# Patient Record
Sex: FEMALE | Race: BLACK OR AFRICAN AMERICAN | NOT HISPANIC OR LATINO | Employment: FULL TIME | URBAN - METROPOLITAN AREA
[De-identification: names, ages, dates, MRNs, and addresses within clinical notes are randomized per-mention and may not be internally consistent; named-entity substitution may affect disease eponyms.]

---

## 2020-04-14 PROBLEM — I10 ESSENTIAL HYPERTENSION: Status: ACTIVE | Noted: 2017-12-27

## 2020-04-14 PROBLEM — J31.0 CHRONIC RHINITIS: Status: ACTIVE | Noted: 2018-02-23

## 2021-10-26 PROBLEM — I10 ESSENTIAL HYPERTENSION: Chronic | Status: ACTIVE | Noted: 2017-12-27

## 2021-10-26 PROBLEM — L29.9 PRURITUS: Status: ACTIVE | Noted: 2021-10-26

## 2021-10-26 PROBLEM — E87.6 HYPOKALEMIA: Status: ACTIVE | Noted: 2021-10-26

## 2021-10-26 PROBLEM — E66.9 OBESITY: Status: ACTIVE | Noted: 2021-10-26

## 2021-10-26 PROBLEM — E66.9 OBESITY: Chronic | Status: ACTIVE | Noted: 2021-10-26

## 2021-10-26 PROBLEM — B37.31 VAGINAL YEAST INFECTION: Status: ACTIVE | Noted: 2021-10-26

## 2021-10-26 PROBLEM — M54.30 SCIATICA: Chronic | Status: ACTIVE | Noted: 2021-10-26

## 2021-10-26 PROBLEM — E11.65 UNCONTROLLED TYPE 2 DIABETES MELLITUS WITH HYPERGLYCEMIA: Status: ACTIVE | Noted: 2021-10-26

## 2021-10-26 PROBLEM — M54.30 SCIATICA: Status: ACTIVE | Noted: 2021-10-26

## 2021-10-26 PROBLEM — E87.6 HYPOKALEMIA: Chronic | Status: ACTIVE | Noted: 2021-10-26

## 2021-10-26 PROBLEM — B37.31 VAGINAL YEAST INFECTION: Chronic | Status: ACTIVE | Noted: 2021-10-26

## 2021-10-26 PROBLEM — Z86.19 HISTORY OF HELICOBACTER PYLORI INFECTION: Status: ACTIVE | Noted: 2021-10-26

## 2021-10-26 PROBLEM — E11.65 UNCONTROLLED TYPE 2 DIABETES MELLITUS WITH HYPERGLYCEMIA: Chronic | Status: ACTIVE | Noted: 2021-10-26

## 2021-10-26 PROBLEM — Z86.19 HISTORY OF HELICOBACTER PYLORI INFECTION: Chronic | Status: ACTIVE | Noted: 2021-10-26

## 2022-02-15 PROBLEM — E11.9 TYPE 2 DIABETES MELLITUS WITHOUT COMPLICATION, WITHOUT LONG-TERM CURRENT USE OF INSULIN: Status: ACTIVE | Noted: 2022-02-15

## 2022-02-15 PROBLEM — E83.42 HYPOMAGNESEMIA: Status: ACTIVE | Noted: 2022-02-15

## 2022-08-10 PROBLEM — F41.3 OTHER MIXED ANXIETY DISORDERS: Status: ACTIVE | Noted: 2022-08-10

## 2022-08-10 PROBLEM — J30.1 ALLERGIC RHINITIS DUE TO POLLEN: Chronic | Status: ACTIVE | Noted: 2022-08-10

## 2022-08-10 PROBLEM — M54.9 DORSALGIA, UNSPECIFIED: Chronic | Status: ACTIVE | Noted: 2022-08-10

## 2022-08-10 PROBLEM — Z00.00 PREVENTATIVE HEALTH CARE: Status: ACTIVE | Noted: 2022-08-10

## 2022-09-29 LAB
LEFT EYE DM RETINOPATHY: NEGATIVE
RIGHT EYE DM RETINOPATHY: NEGATIVE

## 2022-10-21 PROBLEM — E11.65 TYPE 2 DIABETES MELLITUS WITH HYPERGLYCEMIA, WITHOUT LONG-TERM CURRENT USE OF INSULIN: Status: ACTIVE | Noted: 2022-02-15

## 2022-11-14 PROBLEM — Z00.00 PREVENTATIVE HEALTH CARE: Status: RESOLVED | Noted: 2022-08-10 | Resolved: 2022-11-14

## 2022-11-28 ENCOUNTER — PATIENT OUTREACH (OUTPATIENT)
Dept: ADMINISTRATIVE | Facility: HOSPITAL | Age: 52
End: 2022-11-28
Payer: MEDICAID

## 2022-11-28 ENCOUNTER — PATIENT MESSAGE (OUTPATIENT)
Dept: ADMINISTRATIVE | Facility: HOSPITAL | Age: 52
End: 2022-11-28
Payer: MEDICAID

## 2022-12-12 ENCOUNTER — PATIENT OUTREACH (OUTPATIENT)
Dept: ADMINISTRATIVE | Facility: HOSPITAL | Age: 52
End: 2022-12-12
Payer: MEDICAID

## 2022-12-12 PROBLEM — Z79.899 ENCOUNTER FOR LONG-TERM CURRENT USE OF MEDICATION: Status: ACTIVE | Noted: 2022-08-10

## 2022-12-19 ENCOUNTER — PATIENT OUTREACH (OUTPATIENT)
Dept: ADMINISTRATIVE | Facility: HOSPITAL | Age: 52
End: 2022-12-19
Payer: MEDICAID

## 2023-01-06 ENCOUNTER — PATIENT OUTREACH (OUTPATIENT)
Dept: ADMINISTRATIVE | Facility: HOSPITAL | Age: 53
End: 2023-01-06
Payer: MEDICAID

## 2023-02-20 ENCOUNTER — DOCUMENTATION ONLY (OUTPATIENT)
Dept: RESEARCH | Facility: CLINIC | Age: 53
End: 2023-02-20
Payer: MEDICAID

## 2023-02-20 DIAGNOSIS — Z00.6 RESEARCH SUBJECT: Primary | ICD-10-CM

## 2023-02-20 NOTE — PROGRESS NOTES
PROPEL IT CONSENT DOCUMENTATION  Oncore Protocol 2022013: PROPEL IT  PROmoting Successful Weight Loss in Primary CarE in [place] (PROPEL) using Information Technology  : Varun Moore, PhD.  IRB of Record: Formerly [facility] ([facility]) ID# [facility] [id number]  [facility] Investigator: Mary Cochran MD      Informed Consent Process   Name of Study Team member Present for discussion: N/A   Was the consent done electronically: yes  Is the potential subject currently enrolled in any other research trials (per Epic)? no  Prior to the Informed Consent being signed or any protocol required testing, procedure or intervention being performed, the following was completed or discussed:   Purpose of the study, qualifications to participate:  Study design, schedule and procedure:  Risks, benefits, alternative treatments, compensation and costs:  Confidentiality and HIPAA authorization for Release of Medical Records for the research trial/subjects right/study related injury:  Study related contact information:  Voluntary participation and withdrawal from the research trial at any time:  Patient has been offered the opportunity to ask questions regarding the study    and PI contact information given to subject:  Signed copy of consent form was provided to the subject via eMail:  Original consent or copy of electronic consent in subject's chart and uploaded in EPIC:        Bibiana JENNIFER Abdalla electronically signed the informed consent form.   The consent form as reviewed by Bharati Marks  Name: ([facility] personnel reviewing consent form):  Loretta Gabriel, Associate Clinical Research Coordinator   Comments: See  for Consent Forms        I acknowledge that I have reviewed the informed consent form and viewed the volunteer's electronically signed and dated the signature section of the consent forms.yes    Participant ID ([employer] ID) added to Research Study  yes]

## 2023-02-20 NOTE — PROGRESS NOTES
"PROPEL IT Weight Loss: Eligibility Confirmation  Remember to add Participant ID in Research Study  Age as of Today: 52 y.o.    Is patient age 40 to 70 years yes    Is patient race Black/: Yes Epic: Black or      Primary care provider at [facility]: Silva Amaya NP     Does the patient have an active [facility] portal account?  yes    Does the patient have prediabetes or Type 2 diabetes? yes  If yes, Based on: Type II diabetes ICD10 code    LAST HBA1C   Lab Results   Component Value Date    HGBA1C 11.8 (H) 11/17/2022    HGBA1C 10.4 (H) 08/08/2022    HGBA1C 7.2 (H) 02/11/2022          LAST BMI:   BMI Readings from Last 1 Encounters:   11/17/22 37.70 kg/m²      Was the patient's most recent BMI (within the past 12 months) between 30 and 50  yes    PCP REFERRAL  Did provider acknowledge or deny patient's participation? Need to Pend Order to PCP (after Consent)    LAST WEIGHT  (verify if this was an outpatient):   Wt Readings from Last 4 Encounters:   11/17/22 106 kg (233 lb 9.6 oz)   08/08/22 104.6 kg (230 lb 9.6 oz)   03/04/22 101.4 kg (223 lb 9.6 oz)   02/22/22 101.2 kg (223 lb)          PROPEL-IT Screening Date (enter from [email or web address]): 2/17/2023  Does the patient have a baseline clinic weight collected within 4 weeks (34 days) of Screening Date?  No, and within 4 week window     *2. If the "Last Weight at Encounter" is not from outpatient visit or not within 34 days from today enter the next  eligible baseline clinic weight when it occurs.  Weight (kg)                        Date:                     *3. If the last weight is outside of the Screening date window (4 weeks /34 days)  when is the patient's next scheduled clinic appointment?  3/17/2023    Status Updated: 02/20/2023      "

## 2023-03-01 DIAGNOSIS — E11.9 TYPE 2 DIABETES MELLITUS WITHOUT COMPLICATION: ICD-10-CM

## 2023-03-08 ENCOUNTER — PATIENT MESSAGE (OUTPATIENT)
Dept: ADMINISTRATIVE | Facility: HOSPITAL | Age: 53
End: 2023-03-08
Payer: MEDICAID

## 2023-03-09 ENCOUNTER — PATIENT OUTREACH (OUTPATIENT)
Dept: ADMINISTRATIVE | Facility: HOSPITAL | Age: 53
End: 2023-03-09
Payer: MEDICAID

## 2023-03-13 PROBLEM — Z00.00 PREVENTATIVE HEALTH CARE: Status: RESOLVED | Noted: 2022-08-10 | Resolved: 2023-03-13

## 2023-03-16 ENCOUNTER — PATIENT OUTREACH (OUTPATIENT)
Dept: ADMINISTRATIVE | Facility: HOSPITAL | Age: 53
End: 2023-03-16
Payer: MEDICAID

## 2023-03-17 PROBLEM — R29.6 FALLS FREQUENTLY: Status: ACTIVE | Noted: 2023-03-17
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
## 2023-03-17 PROBLEM — M54.32 SCIATICA OF LEFT SIDE: Status: ACTIVE | Noted: 2021-10-26

## 2023-03-17 PROBLEM — R26.2 TROUBLE WALKING: Status: ACTIVE | Noted: 2023-03-17

## 2023-03-17 PROBLEM — M25.531 RIGHT WRIST PAIN: Status: ACTIVE | Noted: 2023-03-17

## 2023-03-17 PROBLEM — G89.29 CHRONIC PAIN OF BOTH SHOULDERS: Status: ACTIVE | Noted: 2023-03-17

## 2023-03-17 PROBLEM — T78.40XA ALLERGIC REACTION: Status: ACTIVE | Noted: 2023-03-17

## 2023-03-17 PROBLEM — K21.9 GASTROESOPHAGEAL REFLUX DISEASE WITHOUT ESOPHAGITIS: Status: ACTIVE | Noted: 2023-03-17

## 2023-03-17 PROBLEM — M25.511 CHRONIC PAIN OF BOTH SHOULDERS: Status: ACTIVE | Noted: 2023-03-17

## 2023-03-17 PROBLEM — M25.512 CHRONIC PAIN OF BOTH SHOULDERS: Status: ACTIVE | Noted: 2023-03-17

## 2023-03-20 ENCOUNTER — RESEARCH ENCOUNTER (OUTPATIENT)
Dept: RESEARCH | Facility: CLINIC | Age: 53
End: 2023-03-20
Payer: MEDICAID

## 2023-03-20 DIAGNOSIS — Z00.6 RESEARCH SUBJECT: ICD-10-CM

## 2023-03-23 ENCOUNTER — TELEPHONE (OUTPATIENT)
Dept: RESEARCH | Facility: CLINIC | Age: 53
End: 2023-03-23
Payer: MEDICAID

## 2023-03-27 ENCOUNTER — TELEPHONE (OUTPATIENT)
Dept: RESEARCH | Facility: CLINIC | Age: 53
End: 2023-03-27
Payer: MEDICAID

## 2023-03-27 NOTE — TELEPHONE ENCOUNTER
CRC spoke with Ms. Abdalla to do the Onboarding call. Pt was not home and requested to be called back. CRC called back, no answer, LVM requesting a call back.

## 2023-04-20 ENCOUNTER — PATIENT MESSAGE (OUTPATIENT)
Dept: RESEARCH | Facility: CLINIC | Age: 53
End: 2023-04-20
Payer: MEDICAID

## 2023-04-20 ENCOUNTER — TELEPHONE (OUTPATIENT)
Dept: RESEARCH | Facility: CLINIC | Age: 53
End: 2023-04-20
Payer: MEDICAID

## 2023-04-20 NOTE — TELEPHONE ENCOUNTER
CRC spoke with Ms. Abdalla to do the Onboarding call. Pt stated she needs to delay intervention for one month. Pt reports she is facing many family issues at this time.CRC will follow up the week of May 22-26, 2023.

## 2023-05-24 ENCOUNTER — TELEPHONE (OUTPATIENT)
Dept: RESEARCH | Facility: CLINIC | Age: 53
End: 2023-05-24
Payer: MEDICAID

## 2023-05-24 NOTE — TELEPHONE ENCOUNTER
CRC contacted Ms. Abdalla to do the Onboarding call as requested by Pt. Ms. Abdalla still requested to delay intervention. Pt stated she is not ready to start and would like to be contacted in the middle of June. CRC will follow up.

## 2023-06-19 PROBLEM — Z00.00 PREVENTATIVE HEALTH CARE: Status: RESOLVED | Noted: 2022-08-10 | Resolved: 2023-06-19

## 2023-06-22 ENCOUNTER — TELEPHONE (OUTPATIENT)
Dept: RESEARCH | Facility: CLINIC | Age: 53
End: 2023-06-22
Payer: MEDICAID

## 2023-06-22 NOTE — TELEPHONE ENCOUNTER
CRC contacted Ms. Abdalla as she requested. Pt stated she no longer wants to participate in the study. CRC was unable to ask any questions as the call was disconnected. [facility] will be informed of Pt's decision.

## 2023-07-21 PROBLEM — R11.0 NAUSEA: Status: ACTIVE | Noted: 2023-07-21

## 2023-07-21 PROBLEM — M62.838 MUSCLE SPASM: Status: ACTIVE | Noted: 2023-07-21

## 2023-08-03 PROBLEM — R35.0 URINARY FREQUENCY: Status: ACTIVE | Noted: 2023-08-03

## 2023-08-03 PROBLEM — Z12.31 ENCOUNTER FOR SCREENING MAMMOGRAM FOR BREAST CANCER: Status: ACTIVE | Noted: 2022-08-10

## 2023-08-03 PROBLEM — R30.0 DYSURIA: Status: ACTIVE | Noted: 2023-08-03

## 2023-10-20 ENCOUNTER — PATIENT OUTREACH (OUTPATIENT)
Dept: ADMINISTRATIVE | Facility: HOSPITAL | Age: 53
End: 2023-10-20
Payer: MEDICAID

## 2023-10-20 DIAGNOSIS — Z79.4 TYPE 2 DIABETES MELLITUS WITHOUT COMPLICATION, WITH LONG-TERM CURRENT USE OF INSULIN: Primary | ICD-10-CM

## 2023-10-20 DIAGNOSIS — E11.9 TYPE 2 DIABETES MELLITUS WITHOUT COMPLICATION, WITH LONG-TERM CURRENT USE OF INSULIN: Primary | ICD-10-CM

## 2023-11-13 ENCOUNTER — PATIENT MESSAGE (OUTPATIENT)
Dept: ADMINISTRATIVE | Facility: HOSPITAL | Age: 53
End: 2023-11-13
Payer: MEDICAID

## 2023-12-26 ENCOUNTER — PATIENT OUTREACH (OUTPATIENT)
Dept: ADMINISTRATIVE | Facility: HOSPITAL | Age: 53
End: 2023-12-26
Payer: MEDICAID

## 2024-02-20 ENCOUNTER — PATIENT MESSAGE (OUTPATIENT)
Dept: ADMINISTRATIVE | Facility: HOSPITAL | Age: 54
End: 2024-02-20
Payer: MEDICAID

## 2024-03-01 ENCOUNTER — PATIENT OUTREACH (OUTPATIENT)
Dept: ADMINISTRATIVE | Facility: HOSPITAL | Age: 54
End: 2024-03-01
Payer: MEDICAID

## 2024-03-01 ENCOUNTER — PATIENT MESSAGE (OUTPATIENT)
Dept: ADMINISTRATIVE | Facility: HOSPITAL | Age: 54
End: 2024-03-01
Payer: MEDICAID

## 2024-03-01 DIAGNOSIS — Z12.31 BREAST CANCER SCREENING BY MAMMOGRAM: ICD-10-CM

## 2024-03-01 DIAGNOSIS — Z79.4 TYPE 2 DIABETES MELLITUS WITHOUT COMPLICATION, WITH LONG-TERM CURRENT USE OF INSULIN: Primary | ICD-10-CM

## 2024-03-01 DIAGNOSIS — E11.9 TYPE 2 DIABETES MELLITUS WITHOUT COMPLICATION, WITH LONG-TERM CURRENT USE OF INSULIN: Primary | ICD-10-CM

## 2024-04-23 ENCOUNTER — PATIENT OUTREACH (OUTPATIENT)
Dept: ADMINISTRATIVE | Facility: HOSPITAL | Age: 54
End: 2024-04-23
Payer: MEDICAID

## 2024-04-23 DIAGNOSIS — Z12.12 SCREENING FOR COLORECTAL CANCER: Primary | ICD-10-CM

## 2024-04-23 DIAGNOSIS — Z12.11 SCREENING FOR COLORECTAL CANCER: Primary | ICD-10-CM

## 2024-04-23 PROBLEM — R30.0 DYSURIA: Status: RESOLVED | Noted: 2023-08-03 | Resolved: 2024-04-23

## 2024-04-23 PROBLEM — R26.2 TROUBLE WALKING: Status: RESOLVED | Noted: 2023-03-17 | Resolved: 2024-04-23

## 2024-04-23 PROBLEM — T78.40XA ALLERGIC REACTION: Status: RESOLVED | Noted: 2023-03-17 | Resolved: 2024-04-23

## 2024-04-23 PROBLEM — R35.0 URINARY FREQUENCY: Status: RESOLVED | Noted: 2023-08-03 | Resolved: 2024-04-23

## 2024-04-23 PROBLEM — R29.6 FALLS FREQUENTLY: Status: RESOLVED | Noted: 2023-03-17 | Resolved: 2024-04-23

## 2024-04-23 PROBLEM — E83.42 HYPOMAGNESEMIA: Status: RESOLVED | Noted: 2022-02-15 | Resolved: 2024-04-23
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
## 2024-04-23 PROBLEM — Z00.6 RESEARCH SUBJECT: Status: RESOLVED | Noted: 2023-03-20 | Resolved: 2024-04-23

## 2024-04-23 PROBLEM — M25.512 CHRONIC PAIN OF BOTH SHOULDERS: Status: RESOLVED | Noted: 2023-03-17 | Resolved: 2024-04-23

## 2024-04-23 PROBLEM — M62.838 MUSCLE SPASM: Chronic | Status: ACTIVE | Noted: 2023-07-21

## 2024-04-23 PROBLEM — L29.9 PRURITUS: Status: RESOLVED | Noted: 2021-10-26 | Resolved: 2024-04-23

## 2024-04-23 PROBLEM — F41.3 OTHER MIXED ANXIETY DISORDERS: Status: RESOLVED | Noted: 2022-08-10 | Resolved: 2024-04-23

## 2024-04-23 PROBLEM — G89.29 CHRONIC PAIN OF BOTH SHOULDERS: Status: RESOLVED | Noted: 2023-03-17 | Resolved: 2024-04-23

## 2024-04-23 PROBLEM — M25.511 CHRONIC PAIN OF BOTH SHOULDERS: Status: RESOLVED | Noted: 2023-03-17 | Resolved: 2024-04-23

## 2024-04-23 PROBLEM — K21.9 GASTROESOPHAGEAL REFLUX DISEASE WITHOUT ESOPHAGITIS: Chronic | Status: ACTIVE | Noted: 2023-03-17

## 2024-04-23 PROBLEM — M25.531 RIGHT WRIST PAIN: Status: RESOLVED | Noted: 2023-03-17 | Resolved: 2024-04-23

## 2024-04-23 PROBLEM — Z79.899 ENCOUNTER FOR LONG-TERM CURRENT USE OF MEDICATION: Status: RESOLVED | Noted: 2022-08-10 | Resolved: 2024-04-23

## 2024-04-23 PROBLEM — Z86.19 HISTORY OF HELICOBACTER PYLORI INFECTION: Chronic | Status: RESOLVED | Noted: 2021-10-26 | Resolved: 2024-04-23

## 2024-04-23 PROBLEM — J31.0 CHRONIC RHINITIS: Status: RESOLVED | Noted: 2018-02-23 | Resolved: 2024-04-23

## 2024-07-23 PROBLEM — Z86.0100 HISTORY OF COLONIC POLYPS: Status: ACTIVE | Noted: 2024-07-23

## 2024-08-15 PROBLEM — K52.9 COLITIS: Status: ACTIVE | Noted: 2024-08-15

## 2024-10-25 PROBLEM — R09.89 CHEST CONGESTION: Status: ACTIVE | Noted: 2024-10-25

## 2024-10-25 PROBLEM — R05.1 ACUTE COUGH: Status: ACTIVE | Noted: 2024-10-25

## 2024-10-25 PROBLEM — R06.02 SOB (SHORTNESS OF BREATH): Status: ACTIVE | Noted: 2024-10-25

## 2024-10-25 PROBLEM — J22 LOWER RESPIRATORY INFECTION: Status: ACTIVE | Noted: 2024-10-25

## 2024-10-25 PROBLEM — R06.2 WHEEZE: Status: ACTIVE | Noted: 2024-10-25

## 2024-11-07 PROBLEM — L29.9 PRURITUS: Status: RESOLVED | Noted: 2021-10-26 | Resolved: 2024-11-07

## 2024-11-07 PROBLEM — Z86.0100 HISTORY OF COLONIC POLYPS: Status: RESOLVED | Noted: 2024-07-23 | Resolved: 2024-11-07

## 2024-11-07 PROBLEM — R06.02 SOB (SHORTNESS OF BREATH): Status: RESOLVED | Noted: 2024-10-25 | Resolved: 2024-11-07

## 2024-11-07 PROBLEM — M54.32 SCIATICA OF LEFT SIDE: Chronic | Status: ACTIVE | Noted: 2021-10-26

## 2024-11-07 PROBLEM — K52.9 COLITIS: Status: RESOLVED | Noted: 2024-08-15 | Resolved: 2024-11-07

## 2025-02-04 PROBLEM — M62.838 MUSCLE SPASM: Chronic | Status: RESOLVED | Noted: 2023-07-21 | Resolved: 2025-02-04

## 2025-02-04 PROBLEM — K51.90 ULCERATIVE COLITIS: Status: ACTIVE | Noted: 2024-08-15

## 2025-02-04 PROBLEM — J22 LOWER RESPIRATORY INFECTION: Status: RESOLVED | Noted: 2024-10-25 | Resolved: 2025-02-04

## 2025-02-04 PROBLEM — M54.9 DORSALGIA, UNSPECIFIED: Chronic | Status: RESOLVED | Noted: 2022-08-10 | Resolved: 2025-02-04

## 2025-02-04 PROBLEM — R09.89 CHEST CONGESTION: Status: RESOLVED | Noted: 2024-10-25 | Resolved: 2025-02-04

## 2025-02-04 PROBLEM — R05.1 ACUTE COUGH: Status: RESOLVED | Noted: 2024-10-25 | Resolved: 2025-02-04

## 2025-05-19 ENCOUNTER — PATIENT OUTREACH (OUTPATIENT)
Dept: ADMINISTRATIVE | Facility: HOSPITAL | Age: 55
End: 2025-05-19

## 2025-05-19 DIAGNOSIS — E11.9 TYPE 2 DIABETES MELLITUS WITHOUT COMPLICATION, WITH LONG-TERM CURRENT USE OF INSULIN: Primary | ICD-10-CM

## 2025-05-19 DIAGNOSIS — Z79.4 TYPE 2 DIABETES MELLITUS WITHOUT COMPLICATION, WITH LONG-TERM CURRENT USE OF INSULIN: Primary | ICD-10-CM

## 2025-05-19 NOTE — PROGRESS NOTES
5-19-25- please address open care gap DM eye exam noted on 5-20-25 appt notes   Care everywhere updated